# Patient Record
Sex: MALE | URBAN - METROPOLITAN AREA
[De-identification: names, ages, dates, MRNs, and addresses within clinical notes are randomized per-mention and may not be internally consistent; named-entity substitution may affect disease eponyms.]

---

## 2021-01-20 ENCOUNTER — IMMUNIZATIONS (OUTPATIENT)
Dept: FAMILY MEDICINE CLINIC | Facility: HOSPITAL | Age: 36
End: 2021-01-20

## 2021-01-20 DIAGNOSIS — Z23 ENCOUNTER FOR IMMUNIZATION: Primary | ICD-10-CM

## 2021-01-20 PROCEDURE — 0011A SARS-COV-2 / COVID-19 MRNA VACCINE (MODERNA) 100 MCG: CPT

## 2021-01-20 PROCEDURE — 91301 SARS-COV-2 / COVID-19 MRNA VACCINE (MODERNA) 100 MCG: CPT

## 2021-02-15 ENCOUNTER — IMMUNIZATIONS (OUTPATIENT)
Dept: FAMILY MEDICINE CLINIC | Facility: HOSPITAL | Age: 36
End: 2021-02-15

## 2021-02-15 DIAGNOSIS — Z23 ENCOUNTER FOR IMMUNIZATION: Primary | ICD-10-CM

## 2021-02-15 PROCEDURE — 91301 SARS-COV-2 / COVID-19 MRNA VACCINE (MODERNA) 100 MCG: CPT

## 2021-02-15 PROCEDURE — 0012A SARS-COV-2 / COVID-19 MRNA VACCINE (MODERNA) 100 MCG: CPT

## 2021-05-25 ENCOUNTER — TELEPHONE (OUTPATIENT)
Dept: ADMINISTRATIVE | Facility: OTHER | Age: 36
End: 2021-05-25

## 2021-05-25 NOTE — TELEPHONE ENCOUNTER
1:18 PM 05/25/21  Attempted communication with Patient regarding documentation of 1- Covid Vaccination 'administered by' [facility] per [facility] Immunization Reconciliation Activity- LM asking for call back to reconcile vaccination reporting of third vaccination date

## 2021-05-27 NOTE — TELEPHONE ENCOUNTER
1:59 PM 05/27/21  Attempted communication with Patient regarding documentation of 1- Covid Vaccination 'administered by' [facility] per [facility] Immunization Reconciliation Activity-  LM asking for call back to reconcile vaccination reporting of third vaccination date

## 2021-06-20 NOTE — TELEPHONE ENCOUNTER
11:18 AM 06/20/21  Attempted Communication with Patient regarding documentation of 1- Covid Vaccination 'administered by' [facility] per [facility] Immunization Reconciliation Activity-  LM asking for call back to reconcile vaccination reporting of third vaccination date

## 2021-06-20 NOTE — TELEPHONE ENCOUNTER
11:40 AM 06/20/21  Communication with Patient regarding documentation of 1- Covid Vaccination 'administered by' [facility] per [facility] Immunization Reconciliation Activity-  Pt stated no Covid Vaccinations with any other facility- only with [facility] and no third vaccination was administered (yet)